# Patient Record
Sex: FEMALE
[De-identification: names, ages, dates, MRNs, and addresses within clinical notes are randomized per-mention and may not be internally consistent; named-entity substitution may affect disease eponyms.]

---

## 2021-09-23 ENCOUNTER — APPOINTMENT (OUTPATIENT)
Dept: ORTHOPEDIC SURGERY | Facility: CLINIC | Age: 56
End: 2021-09-23
Payer: COMMERCIAL

## 2021-09-23 VITALS — WEIGHT: 163 LBS | HEIGHT: 65 IN | BODY MASS INDEX: 27.16 KG/M2

## 2021-09-23 DIAGNOSIS — M75.02 ADHESIVE CAPSULITIS OF LEFT SHOULDER: ICD-10-CM

## 2021-09-23 PROBLEM — Z00.00 ENCOUNTER FOR PREVENTIVE HEALTH EXAMINATION: Status: ACTIVE | Noted: 2021-09-23

## 2021-09-23 PROCEDURE — 99204 OFFICE O/P NEW MOD 45 MIN: CPT | Mod: 25

## 2021-09-23 PROCEDURE — 20611 DRAIN/INJ JOINT/BURSA W/US: CPT | Mod: LT

## 2021-09-23 PROCEDURE — 76882 US LMTD JT/FCL EVL NVASC XTR: CPT | Mod: LT,59

## 2021-09-23 PROCEDURE — 73030 X-RAY EXAM OF SHOULDER: CPT | Mod: LT

## 2021-09-23 NOTE — DISCUSSION/SUMMARY
[de-identified] : ULTRASOUND EVALUATION  REVEALS INFLAMMATORY CHANGES WITHOUT SIGNIFICANT TEAR \par PATIENT HAS ELECTED TO PROCEED WITH KENALOG INJECTION SHOULDER \par RISKS AND BENEFITS DISCUSSED - VERBAL CONSENT OBTAINED \par \par \par POST INJECTION INSTRUCTIONS\par \par COLD THERAPY , ANALGESICS PRN\par \par HOME STRETCHING AND EXERCISES QD  FROZEN SHOULDER HANDOUT PROVIDED, REVIEWED AND DEMONSTRATED \par \par START P.T.  2 WEEKS AFTER INJECTION - 2 X 4 WEEKS \par \par CONSIDER 2ND INJECTION \par \par MRI IF NO RELIEF\par

## 2021-09-23 NOTE — PROCEDURE
[de-identified] : DIAGNOSTIC ULTRASOUND LEFT SHOULDER\par \par DIAGNOSTIC SONOGRAPHY of the Rotator Cuff Soft Tissue of the LEFT  SHOULDER was performed in Multiple Scan Planes with varying transducer frequencies.\par Imaging of the Supraspinatus Tendon reveals TENDONITIS, BURSITIS,  WITH OUT SIGNIFICANT / COMPLETE TEAR\par Imaging of the Biceps Tendon reveals no significant tear.\par Imaging of the Subscapularis Tendon reveals no significant tear.\par Imaging of the Infraspinatus Tendon reveals no significant tear.\par Key images were save digitally and reviewed with patient.\par \par \par INJECTION LEFT SHOULDER GH JOINT AND SA SPACE \par \par Patient has demonstrated limited relief from NSAIDS, rest, exercises / PT, and after discussion of the risks and benefits, the patient has elected to proceed with an ULTRASOUND GUIDED injection into the RIGHT GLENOHUMERAL JOINT AND SA SPACE \par  \par Confirmed that the patient does not have history of prior adverse reactions, active, infections, or relevant allergies. There was no effusion, erythema, or warmth, and the skin was clear\par \par The skin was sterilized with alcohol. Ethyl Chloride was used as a topical anesthetic. Routine sterile technique. \par The site was injected UTILIZING ULTRASOUND GUIDANCE to confirm appropriate placement of the needle-\par with a mixture of medication and local anesthetic. The injection was completed without complication and a bandage was applied.\par  \par The patient tolerated the procedure well and was given post-injection instructions.Rec: Cold therapy, analgesics, avoid heavy activity.\par MEDICATION: 4cc of 1% xylocaine + 20mg of KENALOG EACH SITE \par \par

## 2021-09-23 NOTE — HISTORY OF PRESENT ILLNESS
[de-identified] : CHRONIC LEFT SHOULDER PAIN AND STIFFNESS\par RADIATES DOWN ARM \par PAIN BEGAN JULY 2021- NO SPECIFIC INJURY \par PAIN LEVEL: 1/10, 7/10 WITH USE\par BETTER WITH REST, HEAT\par WORSE WITH OVER HEAD LIFTING, REACHING BEHIND, REACHING TO THE SIDE, AT NIGHT \par \par \par

## 2021-09-23 NOTE — PHYSICAL EXAM
[de-identified] : PHYSICAL EXAM LEFT  SHOULDER - RHD\par \par MODERATE  SCAPULAR PROTRACTION\par AROM 110 / 110 / 70 / 10 \par TENDER: SA REGION / AC JOINT / GH JOINT / BICEPS GROOVE\par \par SPECIAL TESTING :\par POLK - POSITIVE \par RONAN - POSITIVE \par SPEED TEST - POSITIVE\par \par LUTZ - NEGATIVE \par APPREHENSION AND SUPPRESSION - NEGATIVE \par \par RC STRENGTH TESTING \par SS:  5/5\par SUB 5/5\par IS     5/5\par BICEPS  5/5\par \par SENSATION  - GROSSLY INTACT\par \par \par  [de-identified] : LEFT SHOULDER XRAY (2 VIEWS - AP AND OUTLET) -  \par NO OBVIOUS FRACTURE , SEPARATION OR DISLOCATION \par NO SIGNIFICANT OSTEOARTHRITIS, \par TYPE 2B ACROMION \par CSA= 38.8\par

## 2021-09-23 NOTE — ASSESSMENT
[FreeTextEntry1] : EVALUATION AND MANAGEMENT \par \par 1- ADHESIVE CAPSULITIS   - NARROW SS OUTLET ON XRAY CONSISTENT WITH IMPINGEMENT SYNDROME \par PLAN -  HOME EXERCISES DEMONSTRATED AND PROVIDED, \par PROGRESS TO P.T. 2 X 4 WEEKS FOR SCAPULAR POSTURE, FLEXIBILITY AND REHAB \par \par 2 - ADHESIVE CAPSULITIS  / ROTATOR CUFF TENDONITIS - TENDONITIS, BURSITIS, NO COMPLETE TEAR SEEN ON ULTRASOUND EVALUATION \par PLAN - KENALOG INJECTION  ( SEE PROCEDURE NOTE ) \par \par \par

## 2021-11-18 ENCOUNTER — APPOINTMENT (OUTPATIENT)
Dept: ORTHOPEDIC SURGERY | Facility: CLINIC | Age: 56
End: 2021-11-18
Payer: COMMERCIAL

## 2021-11-18 DIAGNOSIS — M75.82 OTHER SHOULDER LESIONS, LEFT SHOULDER: ICD-10-CM

## 2021-11-18 PROCEDURE — 99213 OFFICE O/P EST LOW 20 MIN: CPT

## 2021-11-18 NOTE — DISCUSSION/SUMMARY
[de-identified] : MUCH IMPROVED AFTER KENALOG INJECTION AND P.T. \par RESIDUAL STIFFNESS AND MINOR PAIN IN INT AND EXT ROTATION \par \par COMPLETE P.T. 2X 4 WEEKS - THEN HOME EXERCISES

## 2021-11-18 NOTE — PHYSICAL EXAM
[de-identified] : PHYSICAL EXAM LEFT  SHOULDER - RHD\par \par MODERATE  SCAPULAR PROTRACTION\par AROM 140 / 150 / 80 / 15 \par TENDER: SA REGION / AC JOINT / GH JOINT / BICEPS GROOVE\par \par SPECIAL TESTING :\par POLK - POSITIVE \par RONAN - POSITIVE \par SPEED TEST - POSITIVE\par \par LUTZ - NEGATIVE \par APPREHENSION AND SUPPRESSION - NEGATIVE \par \par RC STRENGTH TESTING \par SS:  5/5\par SUB 5/5\par IS     5/5\par BICEPS  5/5\par \par SENSATION  - GROSSLY INTACT\par \par \par

## 2021-11-18 NOTE — HISTORY OF PRESENT ILLNESS
[de-identified] : CHRONIC LEFT SHOULDER PAIN AND STIFFNESS\par RADIATES DOWN ARM \par PAIN BEGAN JULY 2021- NO SPECIFIC INJURY \par SEPTEMBER 23, 2021- PREVIOUS CORTISONE INJECTION- HELPFUL \par PT IS GOING TO P.T- 10 SESSIONS- HELPFUL \par PAIN LEVEL: 1/10, 7/10 WITH USE\par BETTER WITH REST, HEAT\par WORSE WITH OVER HEAD LIFTING, REACHING BEHIND, REACHING TO THE SIDE, AT NIGHT \par \par \par